# Patient Record
Sex: FEMALE | URBAN - METROPOLITAN AREA
[De-identification: names, ages, dates, MRNs, and addresses within clinical notes are randomized per-mention and may not be internally consistent; named-entity substitution may affect disease eponyms.]

---

## 2019-03-12 ENCOUNTER — TELEPHONE (OUTPATIENT)
Dept: FAMILY MEDICINE | Facility: CLINIC | Age: 84
End: 2019-03-12

## 2019-03-12 NOTE — TELEPHONE ENCOUNTER
[facility] nurse called  Pt is experiencing upper body muscle weakness and symptoms of diarrhea    Nurse wants to recert for home health and order OT  Using  Code of Osteoarthritis and gastritis.    Notes to follow form homehealth

## 2019-04-30 ENCOUNTER — TELEPHONE (OUTPATIENT)
Dept: FAMILY MEDICINE | Facility: CLINIC | Age: 84
End: 2019-04-30

## 2019-04-30 DIAGNOSIS — E55.9 VITAMIN D DEFICIENCY: ICD-10-CM

## 2019-04-30 DIAGNOSIS — I50.20 SYSTOLIC CONGESTIVE HEART FAILURE, UNSPECIFIED HF CHRONICITY: Primary | ICD-10-CM

## 2019-04-30 NOTE — TELEPHONE ENCOUNTER
----- Message from Amna Sandoval sent at 4/29/2019  2:55 PM CDT -----  Pt ask if she can have orders to do lab work early.

## 2019-05-30 ENCOUNTER — TELEPHONE (OUTPATIENT)
Dept: FAMILY MEDICINE | Facility: CLINIC | Age: 84
End: 2019-05-30

## 2019-05-30 DIAGNOSIS — B37.31 YEAST VAGINITIS: Primary | ICD-10-CM

## 2019-05-30 RX ORDER — NYSTATIN 100000 [USP'U]/G
POWDER TOPICAL 3 TIMES DAILY
Qty: 60 G | Refills: 3 | Status: SHIPPED | OUTPATIENT
Start: 2019-05-30 | End: 2019-07-03 | Stop reason: SDUPTHER

## 2019-05-30 NOTE — TELEPHONE ENCOUNTER
----- Message from Amna Sandoval sent at 5/30/2019  9:23 AM CDT -----  Contact: patients daughter  Pts daughter has called office several times very rudely because message wasn't given to her by her mom that her appt was cancelled on Monday @ 2:45 for memorial day she has called over and over again cursing and fussing at staff saying something needs to be about her mom missing her appt and she traveled out here to take mom to appt  very rudely just. wanted to inform you she has called everyday 3 times a day just wanting to constantly argue and curse. She is also asking if you would give her a call [phone].

## 2019-05-31 DIAGNOSIS — M62.81 GENERALIZED MUSCLE WEAKNESS: Primary | ICD-10-CM

## 2019-05-31 DIAGNOSIS — I10 ESSENTIAL HYPERTENSION: ICD-10-CM

## 2019-05-31 DIAGNOSIS — M17.0 PRIMARY OSTEOARTHRITIS OF BOTH KNEES: ICD-10-CM

## 2019-05-31 DIAGNOSIS — I50.22 CHRONIC SYSTOLIC CONGESTIVE HEART FAILURE: ICD-10-CM

## 2019-06-19 ENCOUNTER — TELEPHONE (OUTPATIENT)
Dept: FAMILY MEDICINE | Facility: CLINIC | Age: 84
End: 2019-06-19

## 2019-06-19 NOTE — TELEPHONE ENCOUNTER
----- Message from Mitali Eckert sent at 6/18/2019  8:43 AM CDT -----  Contact: Sharlene wynne Wainwright [phone]  [facility] a Hospital bed order.need the DME Form signed & faxed back and the adendum in the script needs to be put in the notes

## 2019-06-19 NOTE — TELEPHONE ENCOUNTER
Tell them I said that patient has an appointment on the 24th and form will be completed at that visit.  Her last visit was last year and I am unable to do an addendum to that visit because of the new computer system.

## 2019-07-03 ENCOUNTER — OFFICE VISIT (OUTPATIENT)
Dept: FAMILY MEDICINE | Facility: CLINIC | Age: 84
End: 2019-07-03
Payer: MEDICARE

## 2019-07-03 VITALS
DIASTOLIC BLOOD PRESSURE: 64 MMHG | TEMPERATURE: 98 F | WEIGHT: 190 LBS | OXYGEN SATURATION: 99 % | HEART RATE: 91 BPM | SYSTOLIC BLOOD PRESSURE: 113 MMHG

## 2019-07-03 DIAGNOSIS — I10 ESSENTIAL HYPERTENSION: ICD-10-CM

## 2019-07-03 DIAGNOSIS — I50.22 CHRONIC SYSTOLIC CONGESTIVE HEART FAILURE: Primary | ICD-10-CM

## 2019-07-03 DIAGNOSIS — M17.0 PRIMARY OSTEOARTHRITIS OF BOTH KNEES: ICD-10-CM

## 2019-07-03 DIAGNOSIS — B35.6 TINEA CRURIS: ICD-10-CM

## 2019-07-03 PROBLEM — B37.31 YEAST VAGINITIS: Status: ACTIVE | Noted: 2019-07-03

## 2019-07-03 PROBLEM — B37.31 YEAST VAGINITIS: Status: RESOLVED | Noted: 2019-07-03 | Resolved: 2019-07-03

## 2019-07-03 PROCEDURE — 99214 PR OFFICE/OUTPT VISIT, EST, LEVL IV, 30-39 MIN: ICD-10-PCS | Mod: S$GLB,,, | Performed by: FAMILY MEDICINE

## 2019-07-03 PROCEDURE — 99214 OFFICE O/P EST MOD 30 MIN: CPT | Mod: S$GLB,,, | Performed by: FAMILY MEDICINE

## 2019-07-03 RX ORDER — POTASSIUM CHLORIDE 1500 MG/1
TABLET, EXTENDED RELEASE ORAL
Refills: 3 | COMMUNITY
Start: 2019-04-20 | End: 2019-07-03 | Stop reason: SDUPTHER

## 2019-07-03 RX ORDER — PHENOL/SODIUM PHENOLATE
20 AEROSOL, SPRAY (ML) MUCOUS MEMBRANE DAILY
COMMUNITY

## 2019-07-03 RX ORDER — FERROUS SULFATE 325(65) MG
TABLET ORAL
COMMUNITY

## 2019-07-03 RX ORDER — FUROSEMIDE 40 MG/1
40 TABLET ORAL DAILY
Qty: 90 TABLET | Refills: 3 | Status: SHIPPED | OUTPATIENT
Start: 2019-07-03 | End: 2020-02-19

## 2019-07-03 RX ORDER — BETAMETHASONE VALERATE 1.2 MG/G
0.1 CREAM TOPICAL DAILY
COMMUNITY

## 2019-07-03 RX ORDER — NYSTATIN 100000 [USP'U]/G
POWDER TOPICAL 3 TIMES DAILY
Qty: 60 G | Refills: 3 | Status: SHIPPED | OUTPATIENT
Start: 2019-07-03

## 2019-07-03 RX ORDER — FUROSEMIDE 40 MG/1
TABLET ORAL
COMMUNITY
End: 2019-07-03 | Stop reason: SDUPTHER

## 2019-07-03 RX ORDER — CALCIUM CARBONATE 600 MG
600 TABLET ORAL DAILY
COMMUNITY

## 2019-07-03 RX ORDER — NAPROXEN SODIUM 220 MG
220 TABLET ORAL 2 TIMES DAILY WITH MEALS
COMMUNITY

## 2019-07-03 RX ORDER — POTASSIUM CHLORIDE 1500 MG/1
TABLET, EXTENDED RELEASE ORAL
Qty: 90 TABLET | Refills: 3 | Status: SHIPPED | OUTPATIENT
Start: 2019-07-03 | End: 2020-02-19

## 2019-07-03 NOTE — PROGRESS NOTES
Subjective:       Patient ID: Marcelina Mackay is a 89 y.o. female.    Chief Complaint: No chief complaint on file.    HPI  Review of Systems    Objective:      Physical Exam    Assessment:       No diagnosis found.    Plan:

## 2019-07-03 NOTE — PROGRESS NOTES
Subjective:       Patient ID: Marcelina Mackay is a 89 y.o. female.    Chief Complaint: No chief complaint on file.    89-year-old female with congestive heart failure, hypertension, severe left knee osteoarthritis and generalized muscle weakness that is progressively getting worse and she has an inability to stand and transfer on her own without assistance.  She is in need of a hospital bed that will allow her to keep her head elevated height and 30° because of the congestive heart failure a as well as give her capabilities to position herself in bed with assistance since so muscles are weak and she would have difficult time trying to do it independently.  An electric bed would also allow her to transfer in and out of bed easier that with a fixed bed.  It would also help to alleviate pressure points which could lead to decubitus ulcers.  She also needs a refill of nystatin powder, furosemide and potassium.  She has a good appetite.  She has loss 5 lbs in the past 6 months.  She is having pain of the left knee due to the severe osteoarthritis.  She uses her walker at home when needed for going to bathroom and showering.      Review of Systems   Constitutional: Negative for fever.   HENT: Negative for ear pain, postnasal drip, rhinorrhea, sinus pain and sore throat.    Eyes: Negative for redness.   Respiratory: Negative for cough, chest tightness and wheezing.    Cardiovascular: Negative for chest pain, palpitations and leg swelling.   Gastrointestinal: Negative for constipation, diarrhea, nausea and vomiting.   Genitourinary: Negative for difficulty urinating and dysuria.   Musculoskeletal: Positive for arthralgias, gait problem, joint swelling and myalgias.   Skin: Negative for rash.   Neurological: Positive for weakness. Negative for dizziness.       Objective:      Physical Exam   Constitutional: She is oriented to person, place, and time. Vital signs are normal. She appears well-developed and well-nourished.    HENT:   Head: Normocephalic and atraumatic.   Eyes: Pupils are equal, round, and reactive to light. Conjunctivae, EOM and lids are normal.   Neck: Normal range of motion. Neck supple.   Cardiovascular: Normal rate, regular rhythm and normal heart sounds.   Pulmonary/Chest: Effort normal and breath sounds normal. She has no wheezes. She has no rales.   Abdominal: Soft. Normal appearance and bowel sounds are normal. She exhibits no distension and no mass. There is no tenderness. There is no guarding.   Musculoskeletal: She exhibits no edema.        Left knee: She exhibits decreased range of motion, swelling and deformity. Tenderness found.   Neurological: She is alert and oriented to person, place, and time. No cranial nerve deficit.   Skin: Skin is warm and dry. No rash noted. No erythema.   Psychiatric: She has a normal mood and affect. Her behavior is normal.   Nursing note and vitals reviewed.      Assessment:       1. Chronic systolic congestive heart failure    2. Essential hypertension    3. Primary osteoarthritis of both knees    4. Tinea cruris        Plan:     Refill furosemide and potassium and order a hospital bed for this patient.  Hypertension is chronic and controlled on current meds.  Advil and/or Tyelenol OTC for the osteoarthritis for now.  Nystatin powder to apply to affected area when needed.  Follow up in 3 months or pen.

## 2019-07-03 NOTE — PROGRESS NOTES
Subjective:       Patient ID: Marcelina Mackay is a 89 y.o. female.    Chief Complaint: No chief complaint on file.    HPI  Review of Systems    Objective:      Physical Exam    Assessment:       1. Chronic systolic congestive heart failure    2. Yeast vaginitis        Plan:

## 2019-09-03 ENCOUNTER — TELEPHONE (OUTPATIENT)
Dept: FAMILY MEDICINE | Facility: CLINIC | Age: 84
End: 2019-09-03

## 2019-09-04 NOTE — TELEPHONE ENCOUNTER
Patent patient is want her mother to have a flu shot to be administered by home health however the pharmacist will not take prescription for what stated that patient is well have to get the flu vaccine within the pharmacy.

## 2019-09-25 ENCOUNTER — TELEPHONE (OUTPATIENT)
Dept: FAMILY MEDICINE | Facility: CLINIC | Age: 84
End: 2019-09-25

## 2019-09-25 NOTE — TELEPHONE ENCOUNTER
----- Message from Mitali Eckert sent at 9/25/2019  1:46 PM CDT -----  Contact: [facility] [address] [phone]  Daughter would like for Home Health to administer the Flu vaccine to her mother. They just need a script for the flu shot sent to the pharmacy  [facility]

## 2019-12-23 ENCOUNTER — TELEPHONE (OUTPATIENT)
Dept: FAMILY MEDICINE | Facility: CLINIC | Age: 84
End: 2019-12-23

## 2019-12-23 NOTE — TELEPHONE ENCOUNTER
----- Message from Miatli Eckert sent at 12/23/2019  2:01 PM CST -----  Contact: [facility] [address] [phone]  Daughter wants patient to have more physical therapy. Need a diagnosis and a new order

## 2020-02-19 ENCOUNTER — OFFICE VISIT (OUTPATIENT)
Dept: FAMILY MEDICINE | Facility: CLINIC | Age: 85
End: 2020-02-19
Payer: MEDICARE

## 2020-02-19 VITALS
TEMPERATURE: 98 F | WEIGHT: 195.25 LBS | HEART RATE: 72 BPM | OXYGEN SATURATION: 95 % | SYSTOLIC BLOOD PRESSURE: 112 MMHG | DIASTOLIC BLOOD PRESSURE: 64 MMHG

## 2020-02-19 DIAGNOSIS — I50.9 CHRONIC CONGESTIVE HEART FAILURE, UNSPECIFIED HEART FAILURE TYPE: ICD-10-CM

## 2020-02-19 DIAGNOSIS — I10 ESSENTIAL HYPERTENSION: ICD-10-CM

## 2020-02-19 DIAGNOSIS — F02.80 ALZHEIMER'S DEMENTIA WITHOUT BEHAVIORAL DISTURBANCE, UNSPECIFIED TIMING OF DEMENTIA ONSET: ICD-10-CM

## 2020-02-19 DIAGNOSIS — Z00.01 ENCOUNTER FOR ROUTINE ADULT HEALTH EXAMINATION WITH ABNORMAL FINDINGS: Primary | ICD-10-CM

## 2020-02-19 DIAGNOSIS — M17.0 PRIMARY OSTEOARTHRITIS OF BOTH KNEES: ICD-10-CM

## 2020-02-19 DIAGNOSIS — G30.9 ALZHEIMER'S DEMENTIA WITHOUT BEHAVIORAL DISTURBANCE, UNSPECIFIED TIMING OF DEMENTIA ONSET: ICD-10-CM

## 2020-02-19 PROBLEM — F02.818 LATE ONSET ALZHEIMER'S DISEASE WITH BEHAVIORAL DISTURBANCE: Status: ACTIVE | Noted: 2020-02-19

## 2020-02-19 PROBLEM — G30.1 LATE ONSET ALZHEIMER'S DISEASE WITH BEHAVIORAL DISTURBANCE: Status: ACTIVE | Noted: 2020-02-19

## 2020-02-19 PROCEDURE — 99214 OFFICE O/P EST MOD 30 MIN: CPT | Mod: S$GLB,,, | Performed by: FAMILY MEDICINE

## 2020-02-19 PROCEDURE — 99214 PR OFFICE/OUTPT VISIT, EST, LEVL IV, 30-39 MIN: ICD-10-PCS | Mod: S$GLB,,, | Performed by: FAMILY MEDICINE

## 2020-02-19 RX ORDER — TRIAMCINOLONE ACETONIDE 1 MG/ML
LOTION TOPICAL 2 TIMES DAILY
Qty: 60 ML | Refills: 3 | Status: SHIPPED | OUTPATIENT
Start: 2020-02-19

## 2020-02-19 RX ORDER — FUROSEMIDE 40 MG/1
40 TABLET ORAL DAILY
Qty: 90 TABLET | Refills: 3 | Status: SHIPPED | OUTPATIENT
Start: 2020-02-19 | End: 2021-02-18

## 2020-02-19 RX ORDER — POTASSIUM CHLORIDE 20 MEQ/1
20 TABLET, EXTENDED RELEASE ORAL DAILY
Qty: 90 TABLET | Refills: 3 | Status: SHIPPED | OUTPATIENT
Start: 2020-02-19

## 2020-02-19 RX ORDER — TRAMADOL HYDROCHLORIDE 50 MG/1
50 TABLET ORAL EVERY 6 HOURS PRN
Qty: 28 TABLET | Refills: 3 | Status: SHIPPED | OUTPATIENT
Start: 2020-02-19

## 2020-02-19 NOTE — PROGRESS NOTES
Subjective:      Patient ID: Marcelina Mackay is a 89 y.o. female.    Chief Complaint: Follow-up (wellness)    89 yo female in for her annual wellness exam.  She has been with pain of lower extremities as well as edema. She states her ankles and feet have been extremely swollen.  She has not been short which shortness of breath for chest pain according to her daughter.  Patient states she has been doing good she is eating well.  She does urinate frequently.  She has been having a normal bowel movement.  She has been having itching across her upper back.  He has been with dementia that has been progressively getting worse.  She is forgetting the name of some her grandkids.  She is also not aware of the date and time.  She has 1 of her children that is usually there with her as her primary caregiver.  She is still able to feed herself but needs assistance when going to bathroom for toileting and showering.  She also needs to have assistance with meal preparation.  Her children does house cleaning and getting her medications as well as taking care of her bills as well.  She is only taking furosemide 40 mg and potassium 20 mEq daily.  She is also taking Aleve once a day and some vitamins.    Review of Systems   Constitutional: Negative for fever.   HENT: Negative for ear pain, postnasal drip, rhinorrhea, sinus pain and sore throat.    Eyes: Negative for redness.   Respiratory: Negative for cough, chest tightness, shortness of breath and wheezing.    Cardiovascular: Negative for chest pain, palpitations and leg swelling.   Gastrointestinal: Negative for constipation, diarrhea, nausea and vomiting.   Genitourinary: Negative for difficulty urinating and dysuria.   Musculoskeletal: Positive for arthralgias, gait problem and joint swelling.   Skin: Positive for rash.        Itching of the upper back   Neurological: Negative for dizziness.     Medication List with Changes/Refills   Current Medications    BETAMETHASONE VALERATE  0.1% (VALISONE) 0.1 % CREA    Apply 0.1 application topically once daily.    CALCIUM CARBONATE (CALCIUM 600) 600 MG CALCIUM (1,500 MG) TAB    Take 600 mg by mouth once daily.    FERROUS SULFATE (FEOSOL) 325 MG (65 MG IRON) TAB TABLET    ferrous sulfate 325 mg (65 mg iron) tablet   Take 1 tablet every day by oral route.    FUROSEMIDE (LASIX) 40 MG TABLET    Take 1 tablet (40 mg total) by mouth once daily.    NAPROXEN SODIUM (ANAPROX) 220 MG TABLET    Take 220 mg by mouth 2 (two) times daily with meals.    NYSTATIN (MYCOSTATIN) POWDER    Apply topically 3 (three) times daily.    OMEPRAZOLE 20 MG TBEC    Take 20 mg by mouth once daily.    POTASSIUM CHLORIDE (K-TAB) 20 MEQ    TK 1 T PO QD      Objective:   /64 (BP Location: Left arm, Patient Position: Sitting, BP Method: Large (Automatic))   Pulse 72   Temp 97.9 °F (36.6 °C)   Wt 88.6 kg (195 lb 4 oz)   SpO2 95%    There is no height or weight on file to calculate BMI.   Physical Exam   Constitutional: She is oriented to person, place, and time. She appears well-developed and well-nourished.   HENT:   Head: Normocephalic and atraumatic.   Right Ear: Hearing and tympanic membrane normal.   Left Ear: Hearing and tympanic membrane normal.   Nose: Nose normal.   Mouth/Throat: Uvula is midline, oropharynx is clear and moist and mucous membranes are normal.   Eyes: Pupils are equal, round, and reactive to light. Conjunctivae and EOM are normal.   Neck: Normal range of motion. Neck supple.   Cardiovascular: Normal rate, regular rhythm and normal heart sounds.   Pulmonary/Chest: Effort normal and breath sounds normal. She has no wheezes. She has no rales.   Abdominal: Soft. Bowel sounds are normal. She exhibits no distension and no mass. There is no tenderness. There is no guarding.   Musculoskeletal: Normal range of motion. She exhibits no edema or tenderness.   Neurological: She is alert and oriented to person, place, and time. No cranial nerve deficit.   Skin: Skin  is warm and dry. No rash noted. No erythema.   Psychiatric: She has a normal mood and affect. Her speech is normal and behavior is normal. Cognition and memory are impaired. She exhibits abnormal recent memory.   Nursing note and vitals reviewed.    No results found for: WBC, HGB, HCT, PLT, CHOL, TRIG, HDL, LDLDIRECT, ALT, AST, NA, K, CL, CREATININE, BUN, CO2, TSH, PSA, INR, GLUF, HGBA1C, MICROALBUR   Assessment:      Problem List Items Addressed This Visit        Neuro    Late onset Alzheimer's disease with behavioral disturbance       Cardiac/Vascular    Essential hypertension    Relevant Medications    furosemide (LASIX) 40 MG tablet    potassium chloride SA (K-DUR,KLOR-CON) 20 MEQ tablet    Other Relevant Orders    Comprehensive metabolic panel    CBC auto differential    Lipid panel    TSH    Chronic congestive heart failure    Relevant Orders    NT-PRO BETA NATURETIC PEPTIDE       Orthopedic    Primary osteoarthritis of both knees    Relevant Medications    traMADol (ULTRAM) 50 mg tablet    triamcinolone acetonide 0.1% (KENALOG) 0.1 % Lotn       Other    Encounter for routine adult health examination with abnormal findings - Primary           Plan:   Hypertension and congestive heart failure chronic and controlled with Lasix and potassium.  Aleve daily for arthritis pain.  She will be given a trial of tramadol 50 mg to take for severe pain.  Obtain fasting labs today.  Will not start her on medication for dementia at this time.  Referral to Home Health for ongoing care for dementia and congestive heart failure.

## 2020-02-20 LAB
ABS NRBC COUNT: 0 X 10 3/UL (ref 0–0.01)
ABSOLUTE BASOPHIL: 0.04 X 10 3/UL (ref 0–0.22)
ABSOLUTE EOSINOPHIL: 0.17 X 10 3/UL (ref 0.04–0.54)
ABSOLUTE IMMATURE GRAN: 0.02 X 10 3/UL (ref 0–0.04)
ABSOLUTE LYMPHOCYTE: 1.89 X 10 3/UL (ref 0.86–4.75)
ABSOLUTE MONOCYTE: 0.63 X 10 3/UL (ref 0.22–1.08)
ALBUMIN SERPL-MCNC: 4.1 G/DL (ref 3.5–5.2)
ALBUMIN/GLOB SERPL ELPH: 1.1 {RATIO} (ref 1–2.7)
ALP ISOS SERPL LEV INH-CCNC: 86 U/L (ref 35–105)
ALT (SGPT): 6 U/L (ref 0–33)
ANION GAP SERPL CALC-SCNC: 14 MMOL/L (ref 8–17)
AST SERPL-CCNC: 22 U/L (ref 0–32)
BASOPHILS NFR BLD: 0.5 % (ref 0.2–1.2)
BILIRUBIN, TOTAL: 0.41 MG/DL (ref 0–1.2)
BUN/CREAT SERPL: 27.9 (ref 6–20)
CALCIUM SERPL-MCNC: 10 MG/DL (ref 8.6–10.2)
CARBON DIOXIDE, CO2: 28 MMOL/L (ref 22–29)
CHLORIDE: 99 MMOL/L (ref 98–107)
CHOLEST SERPL-MSCNC: 180 MG/DL (ref 100–200)
CREAT SERPL-MCNC: 1.07 MG/DL (ref 0.5–0.9)
EOSINOPHIL NFR BLD: 2.3 % (ref 0.7–7)
GFR ESTIMATION: 47.96
GLOBULIN: 3.7 G/DL (ref 1.5–4.5)
GLUCOSE: 95 MG/DL (ref 75–121)
HCT VFR BLD AUTO: 39.9 % (ref 37–47)
HDLC SERPL-MCNC: 72 MG/DL
HGB BLD-MCNC: 12.9 G/DL (ref 12–16)
IMMATURE GRANULOCYTES: 0.3 % (ref 0–0.5)
LDL/HDL RATIO: 1.3 (ref 1–3)
LDLC SERPL CALC-MCNC: 92.2 MG/DL (ref 0–100)
LYMPHOCYTES NFR BLD: 25.6 % (ref 19.3–53.1)
MCH RBC QN AUTO: 30.9 PG (ref 27–32)
MCHC RBC AUTO-ENTMCNC: 32.3 G/DL (ref 32–36)
MCV RBC AUTO: 95.5 FL (ref 82–100)
MONOCYTES NFR BLD: 8.5 % (ref 4.7–12.5)
NEUTROPHILS ABSOLUTE COUNT: 4.63 X 10 3/UL (ref 2.15–7.56)
NEUTROPHILS NFR BLD: 62.8 %
NT-PROBNP: 301.9 PG/ML (ref 0–450)
NUCLEATED RED BLOOD CELLS: 0 /100 WBC (ref 0–0.2)
PLATELET # BLD AUTO: 214 X 10 3/UL (ref 135–400)
POTASSIUM: 4.6 MMOL/L (ref 3.5–5.1)
PROT SNV-MCNC: 7.8 G/DL (ref 6.4–8.3)
RBC # BLD AUTO: 4.18 X 10 6/UL (ref 4.2–5.4)
RDW-SD: 41.2 FL (ref 37–54)
SODIUM: 141 MMOL/L (ref 136–145)
TRIGL SERPL-MCNC: 79 MG/DL (ref 0–150)
TSH SERPL DL<=0.005 MIU/L-ACNC: 1.81 UIU/ML (ref 0.27–4.2)
UREA NITROGEN (BUN): 29.8 MG/DL (ref 7–22)
WBC # BLD: 7.38 X 10 3/UL (ref 4.3–10.8)

## 2020-02-24 DIAGNOSIS — I50.22 CHRONIC SYSTOLIC CONGESTIVE HEART FAILURE: ICD-10-CM

## 2020-02-24 DIAGNOSIS — F02.818 LATE ONSET ALZHEIMER'S DISEASE WITH BEHAVIORAL DISTURBANCE: Primary | ICD-10-CM

## 2020-02-24 DIAGNOSIS — G30.1 LATE ONSET ALZHEIMER'S DISEASE WITH BEHAVIORAL DISTURBANCE: Primary | ICD-10-CM

## 2020-02-24 DIAGNOSIS — M17.0 PRIMARY OSTEOARTHRITIS OF BOTH KNEES: ICD-10-CM

## 2020-02-24 DIAGNOSIS — I10 ESSENTIAL HYPERTENSION: ICD-10-CM

## 2020-02-27 ENCOUNTER — TELEPHONE (OUTPATIENT)
Dept: FAMILY MEDICINE | Facility: CLINIC | Age: 85
End: 2020-02-27

## 2020-02-27 NOTE — TELEPHONE ENCOUNTER
The number that was given to call back [facility] was incomplete, do you know what the missing number is?

## 2020-02-27 NOTE — TELEPHONE ENCOUNTER
----- Message from Mitali Eckert sent at 2/27/2020 10:10 AM CST -----  Contact: Lyndsay Abrams [phone]  Will be doing intake today at 1:00 will bring updates every couple weeks

## 2020-04-16 ENCOUNTER — PATIENT OUTREACH (OUTPATIENT)
Dept: ADMINISTRATIVE | Facility: HOSPITAL | Age: 85
End: 2020-04-16

## 2020-04-16 NOTE — PROGRESS NOTES
Health Maintenance Updated.  Care everywhere   search bar   Immunizations: Abstracted.  [facility]  [facility]      No records found

## 2020-05-19 ENCOUNTER — TELEPHONE (OUTPATIENT)
Dept: INTERNAL MEDICINE | Facility: CLINIC | Age: 85
End: 2020-05-19

## 2020-05-19 NOTE — TELEPHONE ENCOUNTER
[facility] [employer] form for prior authorization form completed and manually returned by fax.

## 2020-05-19 NOTE — TELEPHONE ENCOUNTER
----- Message from Diya Keen sent at 5/19/2020  9:27 AM CDT -----  Contact: Mildred Nichols called in regards to speak to an nurse to verify some information for the patient , please call back at [phone].        Thanks,  Diya Keen

## 2020-05-25 PROBLEM — Z00.01 ENCOUNTER FOR ROUTINE ADULT HEALTH EXAMINATION WITH ABNORMAL FINDINGS: Status: RESOLVED | Noted: 2020-02-19 | Resolved: 2020-05-25

## 2025-01-28 NOTE — TELEPHONE ENCOUNTER
----- Message from Mitali Eckert sent at 9/3/2019 10:50 AM CDT -----  Contact: Daughter [phone]  Patients daughter was told by [facility] that if Dr Kay wrote a script she could give the patient the flu shot at her home   Procedure will be done under local anesthetic only.